# Patient Record
Sex: MALE | Race: WHITE | URBAN - NONMETROPOLITAN AREA
[De-identification: names, ages, dates, MRNs, and addresses within clinical notes are randomized per-mention and may not be internally consistent; named-entity substitution may affect disease eponyms.]

---

## 2017-07-08 ENCOUNTER — HOSPITAL ENCOUNTER (EMERGENCY)
Facility: HOSPITAL | Age: 47
Discharge: HOME | End: 2017-07-08
Attending: FAMILY MEDICINE | Admitting: FAMILY MEDICINE
Payer: COMMERCIAL

## 2017-07-08 VITALS
SYSTOLIC BLOOD PRESSURE: 126 MMHG | OXYGEN SATURATION: 96 % | RESPIRATION RATE: 16 BRPM | DIASTOLIC BLOOD PRESSURE: 79 MMHG | TEMPERATURE: 100.4 F

## 2017-07-08 DIAGNOSIS — N41.0 PROSTATITIS, ACUTE: ICD-10-CM

## 2017-07-08 LAB
ALBUMIN SERPL-MCNC: 3.3 G/DL (ref 3.4–5)
ALBUMIN UR-MCNC: NEGATIVE MG/DL
ALBUMIN UR-MCNC: NEGATIVE MG/DL
ALP SERPL-CCNC: 54 U/L (ref 40–150)
ALT SERPL W P-5'-P-CCNC: 21 U/L (ref 0–70)
ANION GAP SERPL CALCULATED.3IONS-SCNC: 8 MMOL/L (ref 3–14)
APPEARANCE UR: CLEAR
APPEARANCE UR: CLEAR
AST SERPL W P-5'-P-CCNC: 12 U/L (ref 0–45)
BACTERIA #/AREA URNS HPF: ABNORMAL /HPF
BASOPHILS # BLD AUTO: 0 10E9/L (ref 0–0.2)
BASOPHILS NFR BLD AUTO: 0.4 %
BILIRUB SERPL-MCNC: 1 MG/DL (ref 0.2–1.3)
BILIRUB UR QL STRIP: NEGATIVE
BILIRUB UR QL STRIP: NEGATIVE
BUN SERPL-MCNC: 14 MG/DL (ref 7–30)
CALCIUM SERPL-MCNC: 8.2 MG/DL (ref 8.5–10.1)
CHLORIDE SERPL-SCNC: 105 MMOL/L (ref 94–109)
CO2 SERPL-SCNC: 23 MMOL/L (ref 20–32)
COLOR UR AUTO: ABNORMAL
COLOR UR AUTO: ABNORMAL
CREAT SERPL-MCNC: 1.05 MG/DL (ref 0.66–1.25)
DIFFERENTIAL METHOD BLD: ABNORMAL
EOSINOPHIL # BLD AUTO: 0 10E9/L (ref 0–0.7)
EOSINOPHIL NFR BLD AUTO: 0 %
ERYTHROCYTE [DISTWIDTH] IN BLOOD BY AUTOMATED COUNT: 12.9 % (ref 10–15)
GFR SERPL CREATININE-BSD FRML MDRD: 75 ML/MIN/1.7M2
GLUCOSE SERPL-MCNC: 123 MG/DL (ref 70–99)
GLUCOSE UR STRIP-MCNC: NEGATIVE MG/DL
GLUCOSE UR STRIP-MCNC: NEGATIVE MG/DL
HCT VFR BLD AUTO: 39.2 % (ref 40–53)
HEMOCCULT STL QL IA: NEGATIVE
HGB BLD-MCNC: 13.8 G/DL (ref 13.3–17.7)
HGB UR QL STRIP: ABNORMAL
HGB UR QL STRIP: ABNORMAL
IMM GRANULOCYTES # BLD: 0.1 10E9/L (ref 0–0.4)
IMM GRANULOCYTES NFR BLD: 0.5 %
KETONES UR STRIP-MCNC: 10 MG/DL
KETONES UR STRIP-MCNC: NEGATIVE MG/DL
LACTATE SERPL-SCNC: 2.1 MMOL/L (ref 0.4–2)
LEUKOCYTE ESTERASE UR QL STRIP: ABNORMAL
LEUKOCYTE ESTERASE UR QL STRIP: ABNORMAL
LYMPHOCYTES # BLD AUTO: 0.7 10E9/L (ref 0.8–5.3)
LYMPHOCYTES NFR BLD AUTO: 8 %
MCH RBC QN AUTO: 32.7 PG (ref 26.5–33)
MCHC RBC AUTO-ENTMCNC: 35.2 G/DL (ref 31.5–36.5)
MCV RBC AUTO: 93 FL (ref 78–100)
MONOCYTES # BLD AUTO: 0.3 10E9/L (ref 0–1.3)
MONOCYTES NFR BLD AUTO: 3.6 %
NEUTROPHILS # BLD AUTO: 8.1 10E9/L (ref 1.6–8.3)
NEUTROPHILS NFR BLD AUTO: 87.5 %
NITRATE UR QL: NEGATIVE
NITRATE UR QL: NEGATIVE
NRBC # BLD AUTO: 0 10*3/UL
NRBC BLD AUTO-RTO: 0 /100
PH UR STRIP: 5.5 PH (ref 4.7–8)
PH UR STRIP: 6.5 PH (ref 4.7–8)
PLATELET # BLD AUTO: 136 10E9/L (ref 150–450)
POTASSIUM SERPL-SCNC: 3.4 MMOL/L (ref 3.4–5.3)
PROT SERPL-MCNC: 7 G/DL (ref 6.8–8.8)
RBC # BLD AUTO: 4.22 10E12/L (ref 4.4–5.9)
RBC #/AREA URNS AUTO: 2 /HPF (ref 0–2)
SODIUM SERPL-SCNC: 136 MMOL/L (ref 133–144)
SP GR UR STRIP: 1 (ref 1–1.03)
SP GR UR STRIP: 1 (ref 1–1.03)
URN SPEC COLLECT METH UR: ABNORMAL
URN SPEC COLLECT METH UR: ABNORMAL
UROBILINOGEN UR STRIP-MCNC: NORMAL MG/DL (ref 0–2)
UROBILINOGEN UR STRIP-MCNC: NORMAL MG/DL (ref 0–2)
WBC # BLD AUTO: 9.2 10E9/L (ref 4–11)
WBC #/AREA URNS AUTO: 27 /HPF (ref 0–2)

## 2017-07-08 PROCEDURE — 81003 URINALYSIS AUTO W/O SCOPE: CPT | Mod: 59 | Performed by: FAMILY MEDICINE

## 2017-07-08 PROCEDURE — 87088 URINE BACTERIA CULTURE: CPT | Performed by: FAMILY MEDICINE

## 2017-07-08 PROCEDURE — 25000128 H RX IP 250 OP 636: Performed by: FAMILY MEDICINE

## 2017-07-08 PROCEDURE — 99284 EMERGENCY DEPT VISIT MOD MDM: CPT | Performed by: FAMILY MEDICINE

## 2017-07-08 PROCEDURE — 81001 URINALYSIS AUTO W/SCOPE: CPT | Performed by: FAMILY MEDICINE

## 2017-07-08 PROCEDURE — 99283 EMERGENCY DEPT VISIT LOW MDM: CPT

## 2017-07-08 PROCEDURE — 82274 ASSAY TEST FOR BLOOD FECAL: CPT | Performed by: FAMILY MEDICINE

## 2017-07-08 PROCEDURE — 80053 COMPREHEN METABOLIC PANEL: CPT | Performed by: FAMILY MEDICINE

## 2017-07-08 PROCEDURE — 87186 SC STD MICRODIL/AGAR DIL: CPT | Performed by: FAMILY MEDICINE

## 2017-07-08 PROCEDURE — 87086 URINE CULTURE/COLONY COUNT: CPT | Performed by: FAMILY MEDICINE

## 2017-07-08 PROCEDURE — 25000132 ZZH RX MED GY IP 250 OP 250 PS 637: Performed by: FAMILY MEDICINE

## 2017-07-08 PROCEDURE — 36415 COLL VENOUS BLD VENIPUNCTURE: CPT | Performed by: FAMILY MEDICINE

## 2017-07-08 PROCEDURE — 83605 ASSAY OF LACTIC ACID: CPT | Performed by: FAMILY MEDICINE

## 2017-07-08 PROCEDURE — 85025 COMPLETE CBC W/AUTO DIFF WBC: CPT | Performed by: FAMILY MEDICINE

## 2017-07-08 RX ORDER — SODIUM CHLORIDE 9 MG/ML
1000 INJECTION, SOLUTION INTRAVENOUS CONTINUOUS
Status: DISCONTINUED | OUTPATIENT
Start: 2017-07-08 | End: 2017-07-08 | Stop reason: HOSPADM

## 2017-07-08 RX ORDER — CIPROFLOXACIN 500 MG/1
500 TABLET, FILM COATED ORAL ONCE
Status: COMPLETED | OUTPATIENT
Start: 2017-07-08 | End: 2017-07-08

## 2017-07-08 RX ORDER — CIPROFLOXACIN 500 MG/1
500 TABLET, FILM COATED ORAL 2 TIMES DAILY
Qty: 28 TABLET | Refills: 0 | Status: SHIPPED | OUTPATIENT
Start: 2017-07-08 | End: 2017-07-22

## 2017-07-08 RX ADMIN — CIPROFLOXACIN HYDROCHLORIDE 500 MG: 500 TABLET, FILM COATED ORAL at 20:45

## 2017-07-08 RX ADMIN — CIPROFLOXACIN 500 MG: 500 TABLET, FILM COATED ORAL at 20:45

## 2017-07-08 ASSESSMENT — ENCOUNTER SYMPTOMS
ABDOMINAL PAIN: 1
FATIGUE: 1
LIGHT-HEADEDNESS: 1
CONSTIPATION: 0
MYALGIAS: 1
DIARRHEA: 0
DYSURIA: 0
NAUSEA: 1
FLANK PAIN: 0
ACTIVITY CHANGE: 1
ROS GI COMMENTS: SEE HPI.
SHORTNESS OF BREATH: 0
PSYCHIATRIC NEGATIVE: 1
VOMITING: 0
FREQUENCY: 0
FEVER: 1
WEAKNESS: 1
DIAPHORESIS: 0

## 2017-07-08 NOTE — ED AVS SNAPSHOT
[facility]    [address] [address]    Phone:  [phone]                                       Harlan Akers   MRN: [id number]    Department:  [facility]   Date of Visit:  7/8/2017           After Visit Summary Signature Page     I have received my discharge instructions, and my questions have been answered. I have discussed any challenges I see with this plan with the nurse or doctor.    ..........................................................................................................................................  Patient/Patient Representative Signature      ..........................................................................................................................................  Patient Representative Print Name and Relationship to Patient    ..................................................               ................................................  Date                                            Time    ..........................................................................................................................................  Reviewed by Signature/Title    ...................................................              ..............................................  Date                                                            Time

## 2017-07-08 NOTE — ED NOTES
States his pain is low abdomen and has some burning with urination. Given wipes and a cup and is going to provide a urine sample

## 2017-07-08 NOTE — ED AVS SNAPSHOT
[facility]    [address]    [place] [address]    Phone:  [phone]                                       Harlan Akers   MRN: [id number]    Department:  [facility]   Date of Visit:  7/8/2017           Patient Information     Date Of Birth          1970        Your diagnoses for this visit were:     Prostatitis, acute        You were seen by Kenyetta Alvarez MD.      Follow-up Information     Follow up with Tiffanie Block MD.    Why:  As needed        Discharge Instructions         Bacterial Prostatitis  The prostate gland is part of the male reproductive system. The gland sits just below the bladder. It surrounds the urethra, the tube that carries urine and semen out of the body. Bacterial prostatitis is an infection of the prostate. It causes the prostate to become painful and swollen. The problem often comes on suddenly, and can make you very sick. But it can be treated.     With a healthy prostate, urine flows easily through the urethra.       With a swollen prostate, the urethra narrows. It s harder for urine to go through.      Causes and symptoms  Bacterial prostatitis is due to infection with bacteria (germs). This infection makes the prostate swell. This squeezes the urethra, narrowing or blocking it. Symptoms may be severe. They can include:    Fever and chills    Low back pain    Having to urinate often    Pain with urination    A less forceful urine stream    Need to strain to urinate    Inability to urinate  Treatment  The infection is treated with antibiotics. Take all of the medicine until it's gone, even if you start to feel better. If you don t, the infection may come back and be harder to treat. Your healthcare provider may also suggest other care, such as resting and drinking more fluid. Closely follow any instructions you are given.  Chronic bacterial prostatitis  Prostatitis can turn into a chronic (ongoing) problem. In this case, the  prostate stays swollen and inflamed despite treatment with antibiotics. One possible cause is repeated infections. Symptoms include pain and burning with urination and needing to urinate often. It may also cause lower abdomen or back pain. If you have this problem, your healthcare provider will discuss a treatment plan with you. Treatment usually consists of a 6 to 12 week course of antibiotics.   Date Last Reviewed: 1/1/2017 2000-2017 [employer] [address] [place] [address], [place] [address]. All rights reserved. This information is not intended as a substitute for professional medical care. Always follow your healthcare professional's instructions.             Review of your medicines      START taking        Dose / Directions Last dose taken    ciprofloxacin 500 MG tablet   Commonly known as:  CIPRO   Dose:  500 mg   Quantity:  28 tablet        Take 1 tablet (500 mg) by mouth 2 times daily for 14 days   Refills:  0                Prescriptions were sent or printed at these locations (1 Prescription)                   [facility] [id number]  [place], [place] - [address] [place] [address]   [address] [place] [address]    Telephone:  [phone]   Fax:  [phone]   Hours:                  E-Prescribed (1 of 1)         ciprofloxacin (CIPRO) 500 MG tablet                Procedures and tests performed during your visit     CBC with platelets differential    Cardiac Continuous Monitoring    Comprehensive metabolic panel    Lactic acid    Occult blood fecal HGB immuno    Pulse oximetry nursing    UA reflex to Microscopic and Culture    UA with Microscopic      Orders Needing Specimen Collection     None      Pending Results     No orders found from 7/6/2017 to 7/9/2017.            Pending Culture Results     No orders found from 7/6/2017 to 7/9/2017.            Thank you for choosing [facility]       Thank you for choosing [facility] for your care. Our goal is always to provide you  "with excellent care. Hearing back from our patients is one way we can continue to improve our services. Please take a few minutes to complete the written survey that you may receive in the mail after you visit with us. Thank you!        [employer] Information     [employer] lets you send messages to your doctor, view your test results, renew your prescriptions, schedule appointments and more. To sign up, go to [email or web address] . Click on \"Log in\" on the left side of the screen, which will take you to the Welcome page. Then click on \"Sign up Now\" on the right side of the page.     You will be asked to enter the access code listed below, as well as some personal information. Please follow the directions to create your username and password.     Your access code is: [email or web address]  Expires: 10/6/2017  8:38 PM     Your access code will  in 90 days. If you need help or a new code, please call your [facility] clinic or [phone].        Care EveryWhere ID     This is your Care EveryWhere ID. This could be used by other organizations to access your [facility] medical records  [id number]        Equal Access to Services     FABIOLA CLARKE : Hadyosef Chawla, sathish vasquez, terrie thomas, polina aldrich . So [facility] [phone].    ATENCIÓN: Si habla español, tiene a briseno disposición servicios gratuitos de asistencia lingüística. Vladimir al [phone].    We comply with applicable federal civil rights laws and [place] laws. We do not discriminate on the basis of race, color, national origin, age, disability sex, sexual orientation or gender identity.            After Visit Summary       This is your record. Keep this with you and show to your community pharmacist(s) and doctor(s) at your next visit.                  "

## 2017-07-09 NOTE — DISCHARGE INSTRUCTIONS
Bacterial Prostatitis  The prostate gland is part of the male reproductive system. The gland sits just below the bladder. It surrounds the urethra, the tube that carries urine and semen out of the body. Bacterial prostatitis is an infection of the prostate. It causes the prostate to become painful and swollen. The problem often comes on suddenly, and can make you very sick. But it can be treated.     With a healthy prostate, urine flows easily through the urethra.       With a swollen prostate, the urethra narrows. It s harder for urine to go through.      Causes and symptoms  Bacterial prostatitis is due to infection with bacteria (germs). This infection makes the prostate swell. This squeezes the urethra, narrowing or blocking it. Symptoms may be severe. They can include:    Fever and chills    Low back pain    Having to urinate often    Pain with urination    A less forceful urine stream    Need to strain to urinate    Inability to urinate  Treatment  The infection is treated with antibiotics. Take all of the medicine until it's gone, even if you start to feel better. If you don t, the infection may come back and be harder to treat. Your healthcare provider may also suggest other care, such as resting and drinking more fluid. Closely follow any instructions you are given.  Chronic bacterial prostatitis  Prostatitis can turn into a chronic (ongoing) problem. In this case, the prostate stays swollen and inflamed despite treatment with antibiotics. One possible cause is repeated infections. Symptoms include pain and burning with urination and needing to urinate often. It may also cause lower abdomen or back pain. If you have this problem, your healthcare provider will discuss a treatment plan with you. Treatment usually consists of a 6 to 12 week course of antibiotics.   Date Last Reviewed: 1/1/2017 2000-2017 [employer]. [address] [place] [address], [place] [address]. All rights reserved. This  information is not intended as a substitute for professional medical care. Always follow your healthcare professional's instructions.

## 2017-07-09 NOTE — ED NOTES
Face to face report given with opportunity to observe patient.    Report given to Ashley BETANCUR   7/8/2017  7:13 PM

## 2017-07-09 NOTE — ED NOTES
"Patient presents to emergency room with complaints of abdominal pain. States he was up fishing at the [employer] yesterday and became chilled \"was shivering.\" states he went and laid down after feeling chilled because he felt hot and dizzy. C/o lower abdominal pain and dysuria. Fever. Awake alert. Denies chills currently. Awake alert. IV established. 1 set of blood cultures drawn with IV start. Labs drawn and sent to lab. Denies diarrhea. Denies constipation. Denies nausea.   "

## 2017-07-09 NOTE — ED PROVIDER NOTES
"  History     Chief Complaint   Patient presents with     Abdominal Pain     c/o slight abd pain, chills, fever and slight dizziness     HPI  Harlan Akers is a 47 year old male who was up in the [facility] and started having rigors, mild low abdominal pain, fever and slight dizziness and nausea.  He left the [facility] a day early due to this and presented to the ED for evaluation.  He has no urinary symptoms, no problems with bowels, but does have a \"full sensation\" in the lower abdomen and rectal area.  He had a temp of 100.9 on arrival in the ED, and was mildly uncomfortable.       I have reviewed the Medications, Allergies, Past Medical and Surgical History, and Social History in the Epic system.    Allergies: No Known Allergies      No current facility-administered medications on file prior to encounter.   No current outpatient prescriptions on file prior to encounter.    Patient Active Problem List   Diagnosis     Orthopedic aftercare NEC     Shoulder joint pain       No past surgical history on file.    Social History   Substance Use Topics     Smoking status: Never Smoker     Smokeless tobacco: Not on file     Alcohol use Not on file         There is no immunization history on file for this patient.    BMI: There is no height or weight on file to calculate BMI.      Review of Systems   Constitutional: Positive for activity change, fatigue and fever. Negative for diaphoresis.   HENT: Negative.    Respiratory: Negative for shortness of breath.    Cardiovascular: Negative for chest pain.   Gastrointestinal: Positive for abdominal pain and nausea. Negative for constipation, diarrhea and vomiting.        See HPI.   Genitourinary: Negative for discharge, dysuria, flank pain, frequency and testicular pain.   Musculoskeletal: Positive for myalgias.   Skin: Negative.    Neurological: Positive for weakness and light-headedness.   Psychiatric/Behavioral: Negative.        Physical Exam   BP: 129/75  Heart Rate: 91  Temp: " 100.9  F (38.3  C)  Resp: 18  SpO2: 99 %  Physical Exam   Constitutional: He is oriented to person, place, and time. He appears well-developed and well-nourished. No distress.   HENT:   Head: Normocephalic and atraumatic.   Neck: Normal range of motion. Neck supple.   Cardiovascular: Normal rate, regular rhythm and normal heart sounds.    No murmur heard.  Pulmonary/Chest: Effort normal and breath sounds normal. No respiratory distress. He has no wheezes.   Abdominal: Soft. Bowel sounds are normal. He exhibits no distension. There is no tenderness.   Genitourinary: Prostate is enlarged and tender.   Musculoskeletal: Normal range of motion.   Neurological: He is alert and oriented to person, place, and time.   Skin: Skin is warm and dry.   Psychiatric: He has a normal mood and affect.   Nursing note and vitals reviewed.      ED Course     ED Course     Procedures        Labs Ordered and Resulted from Time of ED Arrival Up to the Time of Departure from the ED   UA MACROSCOPIC WITH REFLEX TO MICRO AND CULTURE - Abnormal; Notable for the following:        Result Value    Ketones Urine 10 (*)     Blood Urine Moderate (*)     Leukocyte Esterase Urine Moderate (*)     All other components within normal limits   CBC WITH PLATELETS DIFFERENTIAL - Abnormal; Notable for the following:     RBC Count 4.22 (*)     Hematocrit 39.2 (*)     Platelet Count 136 (*)     Absolute Lymphocytes 0.7 (*)     All other components within normal limits   COMPREHENSIVE METABOLIC PANEL - Abnormal; Notable for the following:     Glucose 123 (*)     Calcium 8.2 (*)     Albumin 3.3 (*)     All other components within normal limits   LACTIC ACID - Abnormal; Notable for the following:     Lactic Acid 2.1 (*)     All other components within normal limits   ROUTINE UA WITH MICROSCOPIC - Abnormal; Notable for the following:     Specific Gravity Urine 1.001 (*)     Blood Urine Moderate (*)     Leukocyte Esterase Urine Large (*)     WBC Urine 27 (*)      Bacteria Urine None (*)     All other components within normal limits   OCCULT BLOOD FECAL HGB IMMUNO   PULSE OXIMETRY NURSING   CARDIAC CONTINUOUS MONITORING       Assessments & Plan (with Medical Decision Making)   Patient has bacteria in his urine following prostate exam, strongly suggestive of prostatis.  Lactic acid minimally elevated.  He received fluids and will be on Cipro for 14 days, encouraged to drink as much as tolerated.  He will follow up with his doctor in [place] early next week if needed.    I have reviewed the nursing notes.    I have reviewed the findings, diagnosis, plan and need for follow up with the patient.       New Prescriptions    CIPROFLOXACIN (CIPRO) 500 MG TABLET    Take 1 tablet (500 mg) by mouth 2 times daily for 14 days       Final diagnoses:   Prostatitis, acute       7/8/2017   [facility]     Kenyetta Alvarez MD  07/08/17 2008

## 2017-07-11 LAB
BACTERIA SPEC CULT: ABNORMAL
MICRO REPORT STATUS: ABNORMAL
MICROORGANISM SPEC CULT: ABNORMAL
SPECIMEN SOURCE: ABNORMAL

## 2017-07-11 NOTE — PROGRESS NOTES
Urine Culture Final: >100,000 colonies/mL Escherichia coli. Prescribed Cipro PO by Dr. Sushila Julian which sensitivity is showing as susceptible. No further orders at this time.